# Patient Record
Sex: FEMALE
[De-identification: names, ages, dates, MRNs, and addresses within clinical notes are randomized per-mention and may not be internally consistent; named-entity substitution may affect disease eponyms.]

---

## 2021-05-03 ENCOUNTER — NON-APPOINTMENT (OUTPATIENT)
Age: 46
End: 2021-05-03

## 2021-05-03 ENCOUNTER — APPOINTMENT (OUTPATIENT)
Dept: ORTHOPEDIC SURGERY | Facility: CLINIC | Age: 46
End: 2021-05-03
Payer: COMMERCIAL

## 2021-05-03 VITALS
SYSTOLIC BLOOD PRESSURE: 131 MMHG | WEIGHT: 210 LBS | DIASTOLIC BLOOD PRESSURE: 81 MMHG | BODY MASS INDEX: 35.85 KG/M2 | HEIGHT: 64 IN

## 2021-05-03 DIAGNOSIS — Z83.3 FAMILY HISTORY OF DIABETES MELLITUS: ICD-10-CM

## 2021-05-03 DIAGNOSIS — Z78.9 OTHER SPECIFIED HEALTH STATUS: ICD-10-CM

## 2021-05-03 DIAGNOSIS — Z87.09 PERSONAL HISTORY OF OTHER DISEASES OF THE RESPIRATORY SYSTEM: ICD-10-CM

## 2021-05-03 DIAGNOSIS — Z82.49 FAMILY HISTORY OF ISCHEMIC HEART DISEASE AND OTHER DISEASES OF THE CIRCULATORY SYSTEM: ICD-10-CM

## 2021-05-03 DIAGNOSIS — M75.42 IMPINGEMENT SYNDROME OF LEFT SHOULDER: ICD-10-CM

## 2021-05-03 PROCEDURE — 99072 ADDL SUPL MATRL&STAF TM PHE: CPT

## 2021-05-03 PROCEDURE — 99203 OFFICE O/P NEW LOW 30 MIN: CPT

## 2021-05-03 PROCEDURE — 73030 X-RAY EXAM OF SHOULDER: CPT | Mod: LT

## 2021-05-03 RX ORDER — MELOXICAM 7.5 MG/1
7.5 TABLET ORAL
Qty: 30 | Refills: 0 | Status: ACTIVE | COMMUNITY
Start: 2021-05-03 | End: 1900-01-01

## 2021-05-03 NOTE — PHYSICAL EXAM
[de-identified] : Constitutional: Well-nourished, well-developed, No acute distress\par Respiratory:  Good respiratory effort, no SOB\par Lymphatic: No regional lymphadenopathy, no lymphedema\par Psychiatric: Pleasant and normal affect, alert and oriented x3\par Musculoskeletal: normal except where as noted in regional exam\par \par Left shoulder:\par APPEARANCE: no marked deformities, no swelling or malalignment\par POSITIVE TENDERNESS: supraspinatus\par NONTENDER:  infraspinatus, teres minor. biceps. anterior and posterior capsule. AC joint. \par ROM: full with mild painful arc past 60 degrees, no scapular winging or dyskinesia present\par RESISTIVE TESTING: MMT 3/5 ER and empty can, 4/5 IR. painless 4/5 resisted flex/ext, horizontal abd/add \par SPECIAL TESTS: + Velasquez and Neers, mildly + cross arm adduction, neg Speeds, neg Collier's, neg Drop Arm, neg Apprehension. neg apley's scratch test\par  [de-identified] : \par The following radiographs were ordered and read by me during this patient's visit. I reviewed each radiograph in detail with the patient and discussed the findings as highlighted below. \par \par 2 views of the left shoulder were obtained today that show no fracture, or dislocation. There are no degenerative changes seen. There is no malalignment. No obvious osseous abnormality. Otherwise unremarkable.

## 2021-05-03 NOTE — DISCUSSION/SUMMARY
[de-identified] : Discussed findings of today's exam and possible causes of patient's pain.  Educated patient on their most probable diagnosis of left arm pain likely referred from rotator cuff pathology; pain started after the patient's COVID-19 vaccination into the intramuscular deltoid, this causes shutdown of the deltoid and subsequent over utilization of underlying rotator cuff pathology.  Reviewed possible courses of treatment, and we collaboratively decided best course of treatment at this time will include conservative management.  No evidence of acute rotator cuff tear, this is likely exacerbation of prior existing pathology.  Patient started on a course of oral NSAIDs, prescription given for Mobic (We discussed all possible side effects of this medication).  Patient will be started on a course of physical therapy to restore normal range of motion and strength as tolerated.  If no improvement may consider cortisone injection, or if patient has worsening of shoulder/arm pain may consider MRI at that time.  Follow up as needed.  Patient appreciates and agrees with current plan.\par \par This note was generated using dragon medical dictation software.  A reasonable effort has been made for proofreading its contents, but typos may still remain.  If there are any questions or points of clarification needed please notify my office.\par

## 2021-05-03 NOTE — HISTORY OF PRESENT ILLNESS
[de-identified] : RHD Patient is here for left upper arm pain that began 2 weeks ago. She states that the pain is between her shoulder and elbow. She has difficulty lifting her arm. She feels like the pain is in her bone. She has a 3 year old at home. Denies N/T/R/Prior injury. She received her COVID-19 vaccine on 4/9/21. She works a desk job. \par \par The patient's past medical history, past surgical history, medications and allergies were reviewed by me today and documented accordingly. In addition, the patient's family and social history, which were noncontributory to this visit, were reviewed also. Intake form was reviewed. The patient has no family history of arthritis.

## 2022-06-20 ENCOUNTER — APPOINTMENT (OUTPATIENT)
Dept: ORTHOPEDIC SURGERY | Facility: CLINIC | Age: 47
End: 2022-06-20
Payer: COMMERCIAL

## 2022-06-20 VITALS — HEIGHT: 64 IN | BODY MASS INDEX: 36.7 KG/M2 | WEIGHT: 215 LBS

## 2022-06-20 DIAGNOSIS — S92.512A DISPLACED FRACTURE OF PROXIMAL PHALANX OF LEFT LESSER TOE(S), INITIAL ENCOUNTER FOR CLOSED FRACTURE: ICD-10-CM

## 2022-06-20 DIAGNOSIS — Z00.00 ENCOUNTER FOR GENERAL ADULT MEDICAL EXAMINATION W/OUT ABNORMAL FINDINGS: ICD-10-CM

## 2022-06-20 PROCEDURE — 99203 OFFICE O/P NEW LOW 30 MIN: CPT | Mod: 25

## 2022-06-20 PROCEDURE — 73630 X-RAY EXAM OF FOOT: CPT | Mod: LT

## 2022-06-20 PROCEDURE — 29550 STRAPPING OF TOES: CPT

## 2022-06-20 NOTE — ASSESSMENT
[FreeTextEntry1] : wbat\par toe strapping demonstrated\par ice/elevate\par post op shoe\par discussed deformity. will observe for now. discussed can intervene in future if needed\par f/up 1month w/ toe xray

## 2022-06-20 NOTE — PHYSICAL EXAM
[Left] : left foot and ankle [Mild] : mild swelling of toe(s) [5th] : 5th [5___] : eversion 5[unfilled]/5 [2+] : dorsalis pedis pulse: 2+ [] : patient ambulates without assistive device

## 2022-06-20 NOTE — HISTORY OF PRESENT ILLNESS
[Sudden] : sudden [8] : 8 [4] : 4 [Throbbing] : throbbing [Constant] : constant [Household chores] : household chores [Leisure] : leisure [Work] : work [Sleep] : sleep [Ice] : ice [Standing] : standing [Walking] : walking [Full time] : Work status: full time [de-identified] : 6/20/22: struck 5th toe yesterday. went to  and told had fx. no prior foot probs. no dm/tob [] : no [FreeTextEntry1] : left foot/pinky toe [FreeTextEntry3] : 6/19/22 [FreeTextEntry7] : into top of foot a little [FreeTextEntry9] : elevation [de-identified] : 6/19/22 [de-identified] : [facility] [de-identified] : xray

## 2022-07-18 ENCOUNTER — APPOINTMENT (OUTPATIENT)
Dept: ORTHOPEDIC SURGERY | Facility: CLINIC | Age: 47
End: 2022-07-18

## 2022-07-18 VITALS — WEIGHT: 215 LBS | BODY MASS INDEX: 36.7 KG/M2 | HEIGHT: 64 IN

## 2022-07-18 DIAGNOSIS — S92.512D DISPLACED FRACTURE OF PROXIMAL PHALANX OF LEFT LESSER TOE(S), SUBSEQUENT ENCOUNTER FOR FRACTURE WITH ROUTINE HEALING: ICD-10-CM

## 2022-07-18 PROCEDURE — 73660 X-RAY EXAM OF TOE(S): CPT | Mod: LT

## 2022-07-18 PROCEDURE — 99213 OFFICE O/P EST LOW 20 MIN: CPT

## 2022-07-18 NOTE — ASSESSMENT
[FreeTextEntry1] : wbat\par petrona tape\par ice/elevate\par nsaids prn\par f/up 1 month w/ toe xray

## 2022-07-18 NOTE — IMAGING
[Left] : left toe [Toe #: ____] : toe # [unfilled] [The fracture is in acceptable alignment. There is progression in healing seen] : The fracture is in acceptable alignment. There is progression in healing seen

## 2022-07-18 NOTE — HISTORY OF PRESENT ILLNESS
[Sudden] : sudden [Throbbing] : throbbing [Household chores] : household chores [Constant] : constant [Leisure] : leisure [Work] : work [Sleep] : sleep [Ice] : ice [Standing] : standing [Walking] : walking [Full time] : Work status: full time [6] : 6 [1] : 2 [Sharp] : sharp [de-identified] : 6/20/22: struck 5th toe yesterday. went to  and told had fx. no prior foot probs. no dm/tob\par 7/18/22: improving. walking in shoe. petrona taping\par  [] : no [FreeTextEntry1] : left foot/pinky toe [FreeTextEntry3] : 6/19/22 [FreeTextEntry7] : into top of foot a little [FreeTextEntry9] : elevation [de-identified] : surgical shoe [de-identified] : Reza [de-identified] : xray [de-identified] : surgical shoe

## 2022-07-18 NOTE — HISTORY OF PRESENT ILLNESS
[Sudden] : sudden [Throbbing] : throbbing [Constant] : constant [Household chores] : household chores [Leisure] : leisure [Work] : work [Sleep] : sleep [Ice] : ice [Standing] : standing [Walking] : walking [Full time] : Work status: full time [6] : 6 [1] : 2 [Sharp] : sharp [de-identified] : 6/20/22: struck 5th toe yesterday. went to  and told had fx. no prior foot probs. no dm/tob\par 7/18/22: improving. walking in shoe. petrona taping\par  [] : no [FreeTextEntry1] : left foot/pinky toe [FreeTextEntry3] : 6/19/22 [FreeTextEntry7] : into top of foot a little [FreeTextEntry9] : elevation [de-identified] : surgical shoe [de-identified] : Reza [de-identified] : xray [de-identified] : surgical shoe

## 2022-07-18 NOTE — PHYSICAL EXAM
[Left] : left foot and ankle [Mild] : mild swelling of toe(s) [5th] : 5th [5___] : eversion 5[unfilled]/5 [2+] : dorsalis pedis pulse: 2+ [] : non-antalgic [FreeTextEntry3] : swelling improved [FreeTextEntry8] : ttp improved

## 2022-08-15 ENCOUNTER — APPOINTMENT (OUTPATIENT)
Dept: ORTHOPEDIC SURGERY | Facility: CLINIC | Age: 47
End: 2022-08-15